# Patient Record
Sex: MALE | Race: WHITE | URBAN - METROPOLITAN AREA
[De-identification: names, ages, dates, MRNs, and addresses within clinical notes are randomized per-mention and may not be internally consistent; named-entity substitution may affect disease eponyms.]

---

## 2017-06-21 ENCOUNTER — OFFICE VISIT (OUTPATIENT)
Dept: DERMATOLOGY | Facility: AMBULATORY SURGERY CENTER | Age: 82
End: 2017-06-21

## 2017-06-21 ENCOUNTER — HOSPITAL ENCOUNTER (OUTPATIENT)
Dept: LAB | Age: 82
Discharge: HOME | End: 2017-06-21

## 2017-06-21 VITALS
DIASTOLIC BLOOD PRESSURE: 82 MMHG | HEART RATE: 57 BPM | TEMPERATURE: 98.7 F | OXYGEN SATURATION: 99 % | HEIGHT: 69 IN | WEIGHT: 164 LBS | BODY MASS INDEX: 24.29 KG/M2 | SYSTOLIC BLOOD PRESSURE: 162 MMHG

## 2017-06-21 DIAGNOSIS — L57.0 LICHENOID KERATOSIS: ICD-10-CM

## 2017-06-21 DIAGNOSIS — C44.219 BASAL CELL CARCINOMA, EAR, LEFT: Primary | ICD-10-CM

## 2017-06-21 DIAGNOSIS — C44.319 BASAL CELL CARCINOMA OF EYEBROW: ICD-10-CM

## 2017-06-21 DIAGNOSIS — L57.0 ACTINIC KERATOSIS: ICD-10-CM

## 2017-06-21 DIAGNOSIS — Z87.2 HISTORY OF ACTINIC KERATOSES: ICD-10-CM

## 2017-06-21 DIAGNOSIS — Z85.820 PERSONAL HISTORY OF MALIGNANT MELANOMA OF SKIN: ICD-10-CM

## 2017-06-21 DIAGNOSIS — L82.1 OTHER SEBORRHEIC KERATOSIS: ICD-10-CM

## 2017-06-21 DIAGNOSIS — L90.5 SCAR CONDITION AND FIBROSIS OF SKIN: ICD-10-CM

## 2017-06-21 DIAGNOSIS — C44.41 BASAL CELL CARCINOMA OF LEFT SIDE OF NECK: ICD-10-CM

## 2017-06-21 DIAGNOSIS — Z85.828 HISTORY OF SKIN CANCER: ICD-10-CM

## 2017-06-21 DIAGNOSIS — C44.209: ICD-10-CM

## 2017-06-21 RX ORDER — OXYBUTYNIN CHLORIDE 15 MG/1
15 TABLET, EXTENDED RELEASE ORAL DAILY
COMMUNITY

## 2017-06-21 RX ORDER — LANOLIN ALCOHOL/MO/W.PET/CERES
400 CREAM (GRAM) TOPICAL DAILY
COMMUNITY

## 2017-06-21 RX ORDER — LIDOCAINE HYDROCHLORIDE AND EPINEPHRINE 10; 10 MG/ML; UG/ML
3 INJECTION, SOLUTION INFILTRATION; PERINEURAL ONCE
Qty: 3 ML | Refills: 0
Start: 2017-06-21 | End: 2017-06-21

## 2017-06-21 RX ORDER — BUPIVACAINE HYDROCHLORIDE AND EPINEPHRINE 2.5; 5 MG/ML; UG/ML
INJECTION, SOLUTION INFILTRATION; PERINEURAL
Qty: 1.5 ML | Refills: 0
Start: 2017-06-21 | End: 2018-01-03 | Stop reason: ALTCHOICE

## 2017-06-21 NOTE — PROGRESS NOTES
This note is written by Bárbara Perea, as dictated by Harsha Patton. Chelsey Tamez MD.    CC: Skin exam     History of present illness:     Taran Miguel is a 80 y.o. male and established patient who presents for a skin exam. He has a history of numerous skin cancers that have been treated over the years, these included multiple basal cell carcinomas as well as malignant melanoma stage IA 0.07 mm treated on his back with wide excision in October 2015. He presents today for routine 6 month surveillance, which includes the opportunity for skin examination and for treatment of any lesions that are identified. He is aware of a small bump on his left ear at the rim and a scaly patch on his right cheek that is irritated by shaving. Surgical sites on his ears and face and back are well-healed without symptoms. He last year used topical 5 fluorouracil on his forearms and had very good response, the skin there is smooth with no symptoms. He has had bypass surgery. He is feeling well and in his usual state of health today. He has no pain, no current illnesses, no other skin concerns. His allergies, medications, medical, and social history are reviewed by me today. Exam:     He is an awake, alert, and oriented 80 y.o. male who appears well and in no distress. There is no preauricular, submandibular, or cervical lymphadenopathy. I examined his scalp, back, ears, face, and neck, arms, hands, chest and abdomen. He has a 4 x 3 mm shiny pink papule on his left mid helical rim. He has a 4 x 4 mm pearly papule on the right medial eyebrow concerning for basal cell carcinoma. He has a 9 x 6 mm pink patch on his left postauricular neck. He has a thin actinic keratoses on his right cheek. He has a lichenoid keratosis on his left upper arm. He confirms all locations. He has multiple well-healed surgical scars throughout the examined area, none have evidence of recurrence of treated skin cancers.  Melanoma excision scar in his back is also well healed without evidence of recurrence. There is a pigmented seborrheic keratosis without concerning features on his mid back. Assessment/plan:    1. Neoplasm of uncertain behavior, left mid helical rim. A shave biopsy was advised to address this lesion. The procedure was reviewed and verbal and written consents were obtained. The risks of pain, bleeding, infection, and scar were discussed. I performed the procedure. The site was cleansed and anesthetized with 1% lidocaine with epinephrine 1:100,000. A shave biopsy was performed to sample the lesion. Drysol was used for hemostasis. The wound was bandaged and care reviewed. The specimen was processed in the Mohs Laboratory. Dermatology Pathology Report  [facility] [address]  [address]      Name: Tameka Reyes    YOB: 1935    Specimen #: [id number]    Date: 6/21/2017      Submitting physician: Melissa Quinn MD      Source of tissue: Left mid helical rim    Clinical Diagnosis: Basal cell carcinoma     Gross description:  Received fresh is a 4 x 4 mm shave biopsy of skin. The specimen is bisected and processed for frozen vertical sections. Microscopic description: This hematoxylin and eosin stained specimen shows basaloid tumor islands in the dermis    Pathology diagnosis: Nodular basal cell carcinoma       Melissa Quinn MD    2. Basal cell carcinoma, left mid helical rim. I discussed the diagnosis of basal cell carcinoma and summarized the pathology report. Mohs surgery is indicated by site. The procedure was discussed, verbal and written consent were obtained. I performed the procedure. One stage was required to reach a tumor free plane. The surgical defect was managed with complex repair. There were no complications. He will follow up as needed as the site heals.      Indications, risks, and options were discussed with Tameka Reyes preoperatively. Risks including, but not limited to: pain, bleeding, infection, tumor recurrence, scarring and damage to motor and/or sensory nerves, were discussed. Shaneka Mcclain chose Mohs surgery. Shaneka Mcclain was an acceptable surgery candidate. Shaneka Mcclain was placed in the appropriate position on the operating table in the Mohs surgery procedure room. The area was prepped and draped in the standard manner. Gentian violet was used to outline the clinical margins of the tumor. Local anesthesia was then obtained. The grossly visible tumor was then removed, an underlying layer was excised and mapped according to the Mohs technique, and the individual specimens examined microscopically. The process was repeated until microscopic examination of the tissue specimens confirmed a tumor-free plane. Hemostasis was obtained with electrosurgery and pressure. The wound was covered between stages with moist saline gauze. Wound care instructions (written and verbal) and a follow up appointment were given to Shaneka Mcclain before discharge. Shaneka Mcclain was discharged in good condition. The wound management options of second intent healing, layered closure, local flap, and/or full thickness skin graft were discussed. Shaneka Mcclain understands the aims, risks, alternatives, and possible complications and elects to proceed with a complex layered closure. Wound margins were made vertical, edges undermined in the perichondrial plane, standing cones corrected at both poles followed by layered closure. The wound was closed with buried 5-0 polysorb suture in the muscle and deep subcutis to reduce width of the wound, a second layer in the dermis to reduce tension on the skin edges, and skin edges were approximated with 6-0 fast gut suture. The final closure length was 32 mm. The wound was bandaged with Vaseline, Telfa, gauze and Coverroll.  Wound care instructions (written and verbal) and a follow up appointment were given to Tameka Reyes before discharge. Tameka Reyes was discharged in good condition. 3. Neoplasm of uncertain behavior, right medial brow, favor BCC. Excision was advised for removal and therapy of this 4 x 4 mm lesion on the right medial brow.  The excision procedure was reviewed and verbal and written consents were obtained.  The risks of pain, bleeding, infection, recurrence of the lesion, and scar were discussed.  I performed the procedure.  The site was cleansed and anesthetized with 1% lidocaine with epinephrine 1:100,000.  The lesion was excised with a 2 mm margin in an elliptical manner to a depth of subcutaneous tissue.  An intermediate repair was performed after the excision. 5-0 polysorb suture was used for approximation of deep tissues and a second layer of 5-0 polysorb suture was used to approximate the skin edges. The closure length was 14 mm.  The wound was bandaged and care reviewed.  The specimen was sent to pathology. I will contact the patient with the results and any further treatment that may be necessary          4. Neoplasm of uncertain behavior, left post auricular neck. Curettage was advised to address this lesion with malignant destruction. The procedure was reviewed and verbal and written consent were obtained. The risks of pain, bleeding, infection, and scar and recurrence were discussed. I performed the procedure. The site was cleansed and anesthetized with 1% lidocaine with epinephrine 1:100,000. Curettage of the base and 2 mm margin to remove the lesion in its clinical entirety, resulting in a post curettage defect size of 13 by 10  mm. Electrocoagulation was used for hemostasis. The wound was bandaged and care reviewed. The specimen was sent to pathology. I will contact the patient with the results and any further treatment that may be necessary. 5. Actinic Keratoses, right cheek.   The diagnosis of this precancerous lesion related to sun exposure was reviewed. Verbal consent was obtained. I treated 1 lesion with cryotherapy and post-cryotherapy care was reviewed. 6. History of skin cancer including NMSC and melanoma. I discussed the diagnosis and recommend routine examinations with me for surveillance. He will arrange his next appt for December. 7. Seborrheic keratosis on the mid back. Lichenoid keratosis on the arm. I discussed the diagnoses and reassured him come into treatment as needed. 8. History of actinic keratoses. He has well healed after use of 5 fluorouracil. No need for repeat therapy at present given absence of actinic keratoses on his arms and hands. The documentation recorded by the scribe accurately reflects the service I personally performed and the decisions made by me. [facility]   OFFICE PROCEDURE PROGRESS NOTE     Chart reviewed for the following:     Lattie Silvan Margrett Sever, MD, have reviewed the History, Physical and updated the Allergic reactions for [address] performed immediately prior to start of procedure:     Lattie Silvan Margrett Sever, MD, have performed the following reviews on [facility] prior to the start of the procedure:     * Patient was identified by name and date of birth   * Agreement on procedure being performed was verified   * Risks and Benefits explained to the patient   * Procedure site verified and marked as necessary   * Patient was positioned for comfort   * Consent was signed and verified     Time: 10:58 AM   Date of procedure: 6/21/2017  Procedure performed by: Susy Shin.  Margrett Sever, MD   Provider assisted by: LPN   Patient assisted by: self   How tolerated by patient: tolerated the procedure well with no complications   Comments: none

## 2017-06-21 NOTE — PROGRESS NOTES
Pre-op: Patient present today for the evaluation of BCC to the Left mid helical rim. Procedure explained with full understanding. Vitals:    06/21/17 1005   BP: 162/82   Pulse: (!) 57   Temp: 98.7 °F (37.1 °C)   TempSrc: Oral   SpO2: 99%   Weight: 74.4 kg (164 lb)   Height: 5' 8.5\" (1.74 m)     preoperatively, will continue to monitor. Post-op: Written and verbal post-op wound care instructions given to patient with full understanding of care. Surgical wound bandaged with Vaseline, Telfa, 2x2 gauze, and coverall tape. All questions and concerns addressed. Vitals stable postoperatively.

## 2017-06-21 NOTE — MR AVS SNAPSHOT
Visit Information Date & Time Provider Department Dept. Phone Encounter #  
 6/21/2017 10:30 AM Bri Juarez MD [facility] [address] [phone] [id number] Upcoming Health Maintenance Date Due DTaP/Tdap/Td series (1 - Tdap) 6/8/1956 ZOSTER VACCINE AGE 60> 6/8/1995 GLAUCOMA SCREENING Q2Y 6/8/2000 Pneumococcal 65+ Low/Medium Risk (1 of 2 - PCV13) 6/8/2000 MEDICARE YEARLY EXAM 6/8/2000 INFLUENZA AGE 9 TO ADULT 8/1/2017 Allergies as of 6/21/2017  Review Complete On: 6/21/2017 By: Yesi Denise, LPN Severity Noted Reaction Type Reactions Lisinopril-hydrochlorothiazide  12/20/2012    Other (comments) Lip and facial swelling Current Immunizations  Never Reviewed No immunizations on file. Not reviewed this visit Vitals BP Pulse Temp Height(growth percentile) Weight(growth percentile) SpO2  
 162/82 (BP 1 Location: Left arm, BP Patient Position: Sitting) (!) 57 98.7 °F (37.1 °C) (Oral) 5' 8.5\" (1.74 m) 164 lb (74.4 kg) 99% BMI Smoking Status 24.57 kg/m2 Former Smoker BMI and BSA Data Body Mass Index Body Surface Area 24.57 kg/m 2 1.9 m 2 Preferred Pharmacy Pharmacy Name Phone [facility] [address] [place] [address] [phone] Your Updated Medication List  
  
   
This list is accurate as of: 6/21/17 12:02 PM.  Always use your most recent med list.  
  
  
  
  
 aspirin delayed-release 81 mg tablet Take  by mouth daily. * bupivacaine-EPINEPHrine 0.25 %-1:200,000 Soln Commonly known as:  Ke Jonatan Used for mohs surgery  Indications: LOCAL ANESTHESIA FOR PROCEDURES  
  
 * bupivacaine-EPINEPHrine 0.25 %-1:200,000 Soln Commonly known as:  Ke Jonatan Used for mohs surgery  Indications: LOCAL ANESTHESIA FOR PROCEDURES  
  
 fluorouracil 5 % chemo cream  
Commonly known as:  EFUDEX Apply  to affected area two (2) times a day. GLUCOTROL 5 mg tablet Generic drug:  glipiZIDE Take  by mouth two (2) times a day.  
  
 imiquimod 5 % cream  
Commonly known as:  Selena Pantojas Apply 1 Packet to affected area five (5) days a week. apply a thin layer as directed to mid right ear nightly Monday-Friday for 8 weeks total.  Indications: SUPERFICIAL BASAL CELL CARCINOMA losartan 25 mg tablet Commonly known as:  COZAAR Take  by mouth daily. magnesium oxide 400 mg tablet Commonly known as:  MAG-OX Take 400 mg by mouth daily. metoprolol tartrate 50 mg tablet Commonly known as:  LOPRESSOR Take  by mouth two (2) times a day. mirtazapine 15 mg tablet Commonly known as:  Orlean Cancel Take  by mouth nightly. multivitamin tablet Commonly known as:  ONE A DAY Take 1 Tab by mouth daily. oxybutynin chloride XL 15 mg CR tablet Commonly known as:  DITROPAN XL Take 15 mg by mouth daily. * sertraline 100 mg tablet Commonly known as:  ZOLOFT Take 125 mg by mouth daily. * sertraline 50 mg tablet Commonly known as:  ZOLOFT  
  
 simvastatin 20 mg tablet Commonly known as:  ZOCOR Take  by mouth nightly. * terazosin 10 mg capsule Commonly known as:  HYTRIN Take 10 mg by mouth nightly. * terazosin 5 mg capsule Commonly known as:  HYTRIN  
  
 VITAMIN D3 1,000 unit Cap Generic drug:  cholecalciferol Take  by mouth. * Notice: This list has 6 medication(s) that are the same as other medications prescribed for you. Read the directions carefully, and ask your doctor or other care provider to review them with you. Patient Instructions WOUND CARE INSTRUCTIONS 1. Keep the dressing clean and dry and do not remove for 48 hours. 2. Then change the dressing once a day as follows: 
a. Wash hands before and after each dressing change.  
b. Remove dressing and wash site gently with mild soap and water, rinse, and pat dry. 
c. Apply an ointment (Bacitracin, Polysporin, Neosporin, Petroleum jelly or Aquaphor). d. Apply a non-stick (Telfa) dressing or Band-Aid to cover the wound. Remove pressure bandage Saturday, then wash gently and apply Vaseline and a band-aid to site daily for 1 week. 3. Watch for: BLEEDING: A small amount of drainage may occur. If bleeding occurs, elevate and rest the surgery site. Apply gauze and steady pressure for 15 minutes. If bleeding continues, call this office. INFECTION: Signs of infection include increased redness, pain, warmth, drainage of pus, and fever. If this occurs, call this office. 4. Special Instructions (follow any that are checked): ·  You have stitches that need to be removed in 0 days ·  Avoid bending at the waist and heavy lifting for two days. ·  Sleep with your head elevated for the next two nights. ·  Rest the surgery site and keep it elevated as much as possible for two days. ·  You may apply an ice-pack for 10-15 minutes every waking hour for the rest of the day. ·  Eat a soft diet and avoid hot food and hot drinks for the rest of the day. ·  Other instructions: Follow up as directed Take Tylenol for pain as needed. Once the site is healed with no remaining bandages or open areas, protect your surgical site and scar from the sun, as this area will be more sensitive. Use a broad spectrum sunscreen SPF 30 or higher daily, and a chemical free product (one containing zinc oxide or titanium dioxide) is a good choice if the area is sensitive. You may begin to gently massage the surgical site in 2-3 weeks, rubbing in a circular motion along the scar. This can help reduce swelling and thickness of a scar. A scar cream may be used beginnning 1 month after the surgery. If you have any questions or concerns, please call our office Monday through Friday at [phone]. Introducing [facility]! Rosario Quintero introduces [employer] patient portal. Now you can access parts of your medical record, email your doctor's office, and request medication refills online. 1. In your internet browser, go to [email or web address] 2. Click on the First Time User? Click Here link in the Sign In box. You will see the New Member Sign Up page. 3. Enter your [employer] Access Code exactly as it appears below. You will not need to use this code after youve completed the sign-up process. If you do not sign up before the expiration date, you must request a new code. · [employer] Access Code: [email or web address] Expires: 9/19/2017 12:02 PM 
 
4. Enter the last four digits of your Social Security Number (xxxx) and Date of Birth (mm/dd/yyyy) as indicated and click Submit. You will be taken to the next sign-up page. 5. Create a [employer] ID. This will be your [employer] login ID and cannot be changed, so think of one that is secure and easy to remember. 6. Create a [employer] password. You can change your password at any time. 7. Enter your Password Reset Question and Answer. This can be used at a later time if you forget your password. 8. Enter your e-mail address. You will receive e-mail notification when new information is available in [address] 9. Click Sign Up. You can now view and download portions of your medical record. 10. Click the Download Summary menu link to download a portable copy of your medical information. If you have questions, please visit the Frequently Asked Questions section of the [employer] website. Remember, [employer] is NOT to be used for urgent needs. For medical emergencies, dial 911. Now available from your iPhone and Android! Please provide this summary of care documentation to your next provider. If you have any questions after today's visit, please call [phone].

## 2017-06-21 NOTE — PATIENT INSTRUCTIONS
WOUND CARE INSTRUCTIONS    1. Keep the dressing clean and dry and do not remove for 48 hours. 2. Then change the dressing once a day as follows:  a. Wash hands before and after each dressing change. b. Remove dressing and wash site gently with mild soap and water, rinse, and pat dry.  c. Apply an ointment (Bacitracin, Polysporin, Neosporin, Petroleum jelly or Aquaphor). d. Apply a non-stick (Telfa) dressing or Band-Aid to cover the wound. Remove pressure bandage Saturday, then wash gently and apply Vaseline and a band-aid to site daily for 1 week. 3. Watch for:  BLEEDING: A small amount of drainage may occur. If bleeding occurs, elevate and rest the surgery site. Apply gauze and steady pressure for 15 minutes. If bleeding continues, call this office. INFECTION: Signs of infection include increased redness, pain, warmth, drainage of pus, and fever. If this occurs, call this office. 4. Special Instructions (follow any that are checked):  · [] You have stitches that need to be removed in 0 days  · [x] Avoid bending at the waist and heavy lifting for two days. · [x] Sleep with your head elevated for the next two nights. · [x] Rest the surgery site and keep it elevated as much as possible for two days. · [x] You may apply an ice-pack for 10-15 minutes every waking hour for the rest of the day. · [] Eat a soft diet and avoid hot food and hot drinks for the rest of the day. · [] Other instructions: Follow up as directed  Take Tylenol for pain as needed. Once the site is healed with no remaining bandages or open areas, protect your surgical site and scar from the sun, as this area will be more sensitive. Use a broad spectrum sunscreen SPF 30 or higher daily, and a chemical free product (one containing zinc oxide or titanium dioxide) is a good choice if the area is sensitive. You may begin to gently massage the surgical site in 2-3 weeks, rubbing in a circular motion along the scar.  This can help reduce swelling and thickness of a scar. A scar cream may be used beginnning 1 month after the surgery. If you have any questions or concerns, please call our office Monday through Friday at [phone].

## 2017-06-22 NOTE — PROGRESS NOTES
I called his home # on 6/22 and reviewed excised BCC and curetted superficial BCC, no further treatment needed. He is healing well so far.

## 2018-01-03 ENCOUNTER — OFFICE VISIT (OUTPATIENT)
Dept: DERMATOLOGY | Facility: AMBULATORY SURGERY CENTER | Age: 83
End: 2018-01-03

## 2018-01-03 ENCOUNTER — HOSPITAL ENCOUNTER (OUTPATIENT)
Dept: LAB | Age: 83
Discharge: HOME | End: 2018-01-03

## 2018-01-03 VITALS
WEIGHT: 168.4 LBS | DIASTOLIC BLOOD PRESSURE: 80 MMHG | HEIGHT: 69 IN | TEMPERATURE: 97.9 F | RESPIRATION RATE: 20 BRPM | SYSTOLIC BLOOD PRESSURE: 140 MMHG | OXYGEN SATURATION: 97 % | BODY MASS INDEX: 24.94 KG/M2 | HEART RATE: 65 BPM

## 2018-01-03 DIAGNOSIS — D48.5 NEOPLASM OF UNCERTAIN BEHAVIOR OF SKIN OF FOREHEAD: ICD-10-CM

## 2018-01-03 DIAGNOSIS — D48.5 NEOPLASM OF UNCERTAIN BEHAVIOR OF SKIN OF TEMPORAL REGION: Primary | ICD-10-CM

## 2018-01-03 DIAGNOSIS — L90.5 SCAR CONDITION AND FIBROSIS OF SKIN: ICD-10-CM

## 2018-01-03 DIAGNOSIS — L98.9 SKIN LESION OF BACK: ICD-10-CM

## 2018-01-03 DIAGNOSIS — L98.9 SKIN LESION OF CHEEK: ICD-10-CM

## 2018-01-03 DIAGNOSIS — D48.5 NEOPLASM OF UNCERTAIN BEHAVIOR OF SKIN OF CHEEK: ICD-10-CM

## 2018-01-03 DIAGNOSIS — L57.0 ACTINIC KERATOSIS: ICD-10-CM

## 2018-01-03 DIAGNOSIS — Z85.828 FOLLOW-UP SURVEILLANCE OF SKIN CANCER, ENCOUNTER FOR: ICD-10-CM

## 2018-01-03 DIAGNOSIS — Z85.820 PERSONAL HISTORY OF MALIGNANT MELANOMA OF SKIN: ICD-10-CM

## 2018-01-03 DIAGNOSIS — L82.1 SEBORRHEIC KERATOSES: ICD-10-CM

## 2018-01-03 DIAGNOSIS — D48.5 NEOPLASM OF UNCERTAIN BEHAVIOR OF SKIN: ICD-10-CM

## 2018-01-03 DIAGNOSIS — Z85.828 HISTORY OF NONMELANOMA SKIN CANCER: ICD-10-CM

## 2018-01-03 DIAGNOSIS — D48.5 NEOPLASM OF UNCERTAIN BEHAVIOR OF SKIN OF NECK: ICD-10-CM

## 2018-01-03 DIAGNOSIS — Z08 FOLLOW-UP SURVEILLANCE OF SKIN CANCER, ENCOUNTER FOR: ICD-10-CM

## 2018-01-03 NOTE — PROGRESS NOTES
Chief Complaint   Patient presents with    Skin Exam     1. Have you been to the ER, urgent care clinic since your last visit? Hospitalized since your last visit? No    2. Have you seen or consulted any other health care providers outside of the [address] [place] [address] since your last visit? Include any pap smears or colon screening.  No

## 2018-01-03 NOTE — PROGRESS NOTES
Name: Shelby Sandoval       Age: 80 y.o. Date: 1/3/2018    Chief Complaint:   Chief Complaint   Patient presents with    Skin Exam       Subjective:    HPI  Mr. Shelby Sandoval is a 80 y.o. male who presents for a skin exam.  The patient's last skin exam was on 6/21/17 and the patient does have current complaints related to his skin. He reports a lesion in the left sideburn that has been present for a few months. He notes a vast improvement in the scaly spots on his arm status post the use of 5 fluorouracil. He states this was tough during the use and brought a picture to show the reaction which was quite vivid. The patient's pertinent skin history includes : Personal history of melanoma stage Ia of the mid back treated about 2015, multiple nonmelanoma skin cancers, actinic keratoses managed with multiple modalities. ROS: Constitutional: Negative. Dermatological : positive for - skin lesion changes      Social History     Social History    Marital status:      Spouse name: N/A    Number of children: N/A    Years of education: N/A     Occupational History    Not on file.      Social History Main Topics    Smoking status: Former Smoker    Smokeless tobacco: Never Used    Alcohol use No    Drug use: Not on file    Sexual activity: Not on file     Other Topics Concern    Not on file     Social History Narrative       Family History   Problem Relation Age of Onset    No Known Problems Mother     No Known Problems Father        Past Medical History:   Diagnosis Date    Diabetes mellitus ([address]     Essential hypertension     Skin cancer     Basal Cell Carcinoma    Skin cancer     Melanoma       Past Surgical History:   Procedure Laterality Date    AMB POC MOHS 1 STAGE H/N/HF/G  2010    Basal Cell Carcinoma    AMB POC MOHS 1 STAGE H/N/HF/G  12/20/12    Basal Cell Carcinoma; right upper lip       Current Outpatient Prescriptions   Medication Sig Dispense Refill    magnesium oxide (MAG-OX) 400 mg tablet Take 400 mg by mouth daily.  sertraline (ZOLOFT) 50 mg tablet       terazosin (HYTRIN) 5 mg capsule       aspirin delayed-release 81 mg tablet Take  by mouth daily.  metoprolol (LOPRESSOR) 50 mg tablet Take  by mouth two (2) times a day.  Cholecalciferol, Vitamin D3, (VITAMIN D3) 1,000 unit cap Take  by mouth.  multivitamin (ONE A DAY) tablet Take 1 Tab by mouth daily.  mirtazapine (REMERON) 15 mg tablet Take  by mouth nightly.  simvastatin (ZOCOR) 20 mg tablet Take  by mouth nightly.  oxybutynin chloride XL (DITROPAN XL) 15 mg CR tablet Take 15 mg by mouth daily.  fluorouracil (EFUDEX) 5 % chemo cream Apply  to affected area two (2) times a day. 40 g 0    imiquimod (ALDARA) 5 % cream Apply 1 Packet to affected area five (5) days a week. apply a thin layer as directed to mid right ear nightly Monday-Friday for 8 weeks total.  Indications: SUPERFICIAL BASAL CELL CARCINOMA 12 Packet 6    sertraline (ZOLOFT) 100 mg tablet Take 125 mg by mouth daily.  terazosin (HYTRIN) 10 mg capsule Take 10 mg by mouth nightly.  losartan (COZAAR) 25 mg tablet Take  by mouth daily.  glipiZIDE (GLUCOTROL) 5 mg tablet Take  by mouth two (2) times a day. Allergies   Allergen Reactions    Lisinopril-Hydrochlorothiazide Other (comments)     Lip and facial swelling          Objective:    Visit Vitals    /80 (BP 1 Location: Left arm, BP Patient Position: Sitting)    Pulse 65    Temp 97.9 °F (36.6 °C) (Oral)    Resp 20    Ht 5' 8.5\" (1.74 m)    Wt 76.4 kg (168 lb 6.4 oz)    SpO2 97%    BMI 25.23 kg/m2       Katherin Milan is a 80 y.o. male who appears well and in no distress. He is awake, alert, and oriented. There is no preauricular, submandibular, or cervical lymphadenopathy.   A skin examination was performed including his scalp, face (including eyelids), ears, neck, chest, back, abdomen, upper extremities (including digits/nails), breasts. There are thin scaled actinic keratosis noted on each dorsal hand. There is been vast improvement in the number of actinic keratoses noted on each forearm. He has fair skin. He is scattered stuck on waxy macules and keratotic papules consistent with seborrheic keratosis. There are multiple well-healed scars without evidence of lesion recurrence-located on the face and the trunk. He has a few scattered cherry angiomas. On the left upper back there is a 8 x 8 mm indistinct pink shiny macule with telangiectasias concerning for basal cell carcinoma. On the left temple there is a 4 x 4 millimeter pink shiny papule with a dot of pigment and telangiectasias, concerning for basal cell carcinoma. Just inferior to this are patches of pink macule, concerning for superficial basal cell as well. On the left sideburn there is a hemorrhagic crusted papule, 10 x 5 mm, concerning for basal cell carcinoma. On the left mid forehead there is a 3 x 2 mm pink shiny papule telangiectasias, just inferior to the scar, concerning for basal cell carcinoma. On the right post regular neck there is a 6 x 4 mm indistinct pink shiny macule, at the adjacent edge of the scar, concerning for basal cell carcinoma. On the left cheek is a 4 x 4 mm pink shiny papule concerning for BCC. Last, on the right preauricular cheek there is a 3 x 3 mm shiny pink papule concerning for basal cell carcinoma located the inferior aspect of the scar. His melanoma scar on his back is without nodularity or pigment to suggest lesion recurrence in the scar or surrounding skin. Assessment/Plan:  1. Neoplasm of Uncertain Behavior, left temple. The differential diagnoses were discussed. A shave biopsy was advised to sample this lesion. The procedure was reviewed and verbal and written consent were obtained. The risks of pain, bleeding, infection, and scar were discussed.   The patient is aware that this is a sample and is intended for diagnosis and not therapy of the skin lesion. I performed the procedure. The site was cleansed and anesthetized with 1% Lidocaine with Epinephrine 1:100,000. A shave biopsy was performed to sample the lesion. Drysol was used for hemostasis. The wound was bandaged and care reviewed. The specimen was sent to pathology. I will contact the patient with the results and any further treatment that may be necessary. 2.Neoplasm of Uncertain Behavior, left sideburn. The differential diagnoses were discussed. A shave biopsy was advised to sample this lesion. The procedure was reviewed and verbal and written consent were obtained. The risks of pain, bleeding, infection, and scar were discussed. The patient is aware that this is a sample and is intended for diagnosis and not therapy of the skin lesion. I performed the procedure. The site was cleansed and anesthetized with 1% Lidocaine with Epinephrine 1:100,000. A shave biopsy was performed to sample the lesion. Drysol was used for hemostasis. The wound was bandaged and care reviewed. The specimen was sent to pathology. I will contact the patient with the results and any further treatment that may be necessary. 3. Neoplasm of Uncertain Behavior, left cheek. The differential diagnoses were discussed. Dr Summer Cristina will excise at next appt. 4.Neoplasm of Uncertain Behavior, left mid forehead. The differential diagnoses were discussed. Shave biopsy and then curettage was advised to address this lesion with malignant destruction. The procedure was reviewed and verbal and written consent were obtained. The risks of pain, bleeding, infection, scar, and recurrence of the lesion were discussed. I performed the procedure. The site was cleansed and anesthetized with 1% Lidocaine with Epinephrine 1:100,000. Curettage was performed by me to include a 2 mm margin, resulting in a post curettage defect size of 7 x 6 mm.   Drysol was used for hemostasis. The wound was bandaged and care reviewed. The specimen was sent to pathology. I will contact the patient with the results and any further treatment that may be necessary. 5.Neoplasm of Uncertain Behavior, right preauricular cheek. The differential diagnoses were discussed. Shave biopsy and curettage was advised to address this lesion with malignant destruction. The procedure was reviewed and verbal and written consent were obtained. The risks of pain, bleeding, infection, scar, and recurrence of the lesion were discussed. I performed the procedure. The site was cleansed and anesthetized with 1% Lidocaine with Epinephrine 1:100,000. Curettage was performed by me to include a 2 mm margin, resulting in a post curettage defect size of 7 x 7 mm. Drysol was used for hemostasis. The wound was bandaged and care reviewed. The specimen was sent to pathology. I will contact the patient with the results and any further treatment that may be necessary. 6. Neoplasm of Uncertain Behavior, right postauricular neck. The differential diagnoses were discussed. Curettage was advised to address this lesion with malignant destruction. The procedure was reviewed and verbal and written consent were obtained. The risks of pain, bleeding, infection, scar, and recurrence of the lesion were discussed. I performed the procedure. The site was cleansed and anesthetized with 1% Lidocaine with Epinephrine 1:100,000. Curettage was performed by me to include a 2 mm margin, resulting in a post curettage defect size of 10 x 8 mm. Drysol was used for hemostasis. The wound was bandaged and care reviewed. The specimen was sent to pathology. I will contact the patient with the results and any further treatment that may be necessary. 7. Seborrheic keratoses. The diagnosis was reviewed and the patient was reassured that no treatment is needed for these benign lesions.   8.Personal history of skin cancer - melanoma and non melanoma skin cancers. I discussed sun protection, sunscreen use, the warning signs of skin cancer, the need for self-skin examinations, and the need for regular practitioner exams every 6 months. The patient should follow up sooner as needed if new, changing, or symptomatic skin lesions arise. 9. Neoplasm of Uncertain Behavior, left upper back. The differential diagnoses were discussed. Will observe for now, BCC favored. 10. Actinic Keratoses. The diagnosis of this precancerous lesion related to sun exposure was reviewed. Verbal consent was obtained. I treated 3 lesions with cryotherapy and post-cryotherapy care was reviewed. [facility]   OFFICE PROCEDURE PROGRESS NOTE   Chart reviewed for the following:   IOvi, have reviewed the History, Physical and updated the Allergic reactions for Myke Rim. TIME OUT performed immediately prior to start of procedure:   Graciela JIMENEZ, have performed the following reviews on Myke Rim   prior to the start of the procedure:     * Patient was identified by name and date of birth   * Agreement on procedure being performed was verified   * Risks and Benefits explained to the patient   * Procedure site verified and marked as necessary   * Patient was positioned for comfort   * Consent was signed and verified     Time: 1187  Date of procedure: 1/3/2018  Procedure performed by: Dedra Mortimer.  Elizabeth Lund DNP  Provider assisted by: Judi Morataya   Patient assisted by: self   How tolerated by patient: tolerated the procedure well with no complications   Comments: none

## 2018-01-03 NOTE — PATIENT INSTRUCTIONS
Self Skin Exam and Sunscreens    Early detection and treatment is essential in the treatment of all forms of skin cancer. If caught early, all forms of skin cancer are curable. In addition to your regular visits, you should perform a monthly skin examination. Over time, you become familiar with what is normally found on your skin and can identify new or suspicious spots. One of the screening tools you can use to assess your skin is to follow the ABCDEs:    A= Asymmetry (One half is unlike the other half)     B= Border (An irregular, scalloped or poorly defined edge)    C= Color (Is varied from one area to another, has shades of tan, brown/ black,       white, red or blue)    D= Diameter (Spots larger than 6mm or a pencil eraser)    E= Evolving (New spots or one that is changing in size, shape, or color)    A follow- up interval will be customized based on your history of skin cancer or level of skin damage and risk factors. In any case, if you notice a suspicious or new spot, an appointment should be arranged between regular visits. Everyone should use sunscreen and sun-safe practices, which is especially important for those with a personal or family history of skin cancer. Suggestions for this include:    1. Use daily moisturizers containing SPF 30 or higher. 2. Wear long sleeve clothing with UPF ratings and a broad-brimmed hat. 3. Apply sunscreen with SPF 30 or higher to all sun exposed areas if you are going to be in the sun. A broad spectrum UVA/ UVB sunscreen is best.  Dont forget to REAPPLY every two hours or more often if swimming or sweating! 4. Avoid outside activities during peak sun hours, especially in the summer (10am- 2pm). 5. DO NOT use tanning beds. Using sunscreen and sun-safe practices can help reduce the likelihood of developing skin cancer or additional skin cancers in those previously diagnosed. Antonio Vogel

## 2018-01-03 NOTE — PROGRESS NOTES
Mr. Bo Marquez comes in for follow-up. He is an 27-year-old man who has had numerous nonmelanoma skin cancers, he has also had a stage I a 0.07 mm melanoma on his back treated with excision several years ago, October 2015. He presents today for his routine surveillance, last seen in June. He has no particular complaints related to his skin. He used topical 5 fluorouracil for actinic keratosis on the arms and hands, he developed an expected reaction which has now resolved. His skin is smoother. He is feeling well and in his usual state of health today. He has no pain, no current illnesses, no other skin concerns. His allergies medications medical and social history are reviewed by me today. I have reviewed the history, physical exam, assessment and plan by Vernon Bansal NP and agree. He is awake alert man who appears well. I examined his scalp face ears neck chest back abdomen arms and hands. His forearms and hands show significant reduction in the number of actinic keratoses. He has several lesions of concern for basal cell carcinomas. These include new primary lesions on the mid forehead, left temple, left side burn, left cheek, and right cheek just below a surgical scar and recurrent lesion on the right postauricular neck. He has a shotty papilloma left upper back, small lesion 4 mm in size, concerning for new basal cell carcinoma. He has multiple well-healed surgical scars. Please refer to further advance the chart for documentation of these lesions. Each diagnosis was reviewed with him. He has a large burden of skin cancer at this visit, these will be addressed in logical manner to manage the skin cancers as is most appropriate for the tumor features and for him. Curettage was performed for the small basal cell carcinomas on his left before head, right cheek below the scar, and right postauricular neck. The small basal cell carcinoma on his left back will be observed for now and treated at a future visit. Nose surgery would be considered for basal cell carcinomas on his left temple and left side burn, the 2 larger and more indistinct lesions, and excision will be considered for the basal cell carcinoma on his left cheek. This would be scheduled at a future visit March 21 to accommodate his travel time from [facility]. He understands, his questions and concerns were answered.

## 2018-01-03 NOTE — MR AVS SNAPSHOT
Visit Information Date & Time Provider Department Dept. Phone Encounter #  
 1/3/2018 10:30 AM BLAINE Panda 8057 [phone] [id number] Your Appointments 1/3/2018 10:30 AM  
Office Visit with BLAINE Panda [address]057 [address]) Appt Note: est pt - yearly skin exam; est pt - yearly skin exam  
 [facility] [address] [address] Upcoming Health Maintenance Date Due DTaP/Tdap/Td series (1 - Tdap) 6/8/1956 ZOSTER VACCINE AGE 60> 4/8/1995 GLAUCOMA SCREENING Q2Y 6/8/2000 Pneumococcal 65+ High/Highest Risk (1 of 2 - PCV13) 6/8/2000 MEDICARE YEARLY EXAM 6/8/2000 Influenza Age 5 to Adult 8/1/2017 Allergies as of 1/3/2018  Review Complete On: 1/3/2018 By: Juanita Good Severity Noted Reaction Type Reactions Lisinopril-hydrochlorothiazide  12/20/2012    Other (comments) Lip and facial swelling Current Immunizations  Never Reviewed No immunizations on file. Not reviewed this visit Vitals BP Pulse Temp Resp Height(growth percentile) Weight(growth percentile) 140/80 (BP 1 Location: Left arm, BP Patient Position: Sitting) 65 97.9 °F (36.6 °C) (Oral) 20 5' 8.5\" (1.74 m) 168 lb 6.4 oz (76.4 kg) SpO2 BMI Smoking Status 97% 25.23 kg/m2 Former Smoker Vitals History BMI and BSA Data Body Mass Index Body Surface Area  
 25.23 kg/m 2 1.92 m 2 Preferred Pharmacy Pharmacy Name Phone [address] [address] [facility] [address] [phone] Your Updated Medication List  
  
   
This list is accurate as of: 1/3/18 10:23 AM.  Always use your most recent med list.  
  
  
  
  
 aspirin delayed-release 81 mg tablet Take  by mouth daily.   
  
 fluorouracil 5 % chemo cream  
 Commonly known as:  EFUDEX Apply  to affected area two (2) times a day. GLUCOTROL 5 mg tablet Generic drug:  glipiZIDE Take  by mouth two (2) times a day.  
  
 imiquimod 5 % cream  
Commonly known as:  Clyde Ket Apply 1 Packet to affected area five (5) days a week. apply a thin layer as directed to mid right ear nightly Monday-Friday for 8 weeks total.  Indications: SUPERFICIAL BASAL CELL CARCINOMA losartan 25 mg tablet Commonly known as:  COZAAR Take  by mouth daily. magnesium oxide 400 mg tablet Commonly known as:  MAG-OX Take 400 mg by mouth daily. metoprolol tartrate 50 mg tablet Commonly known as:  LOPRESSOR Take  by mouth two (2) times a day. mirtazapine 15 mg tablet Commonly known as:  Veola Kitchen Take  by mouth nightly. multivitamin tablet Commonly known as:  ONE A DAY Take 1 Tab by mouth daily. oxybutynin chloride XL 15 mg CR tablet Commonly known as:  DITROPAN XL Take 15 mg by mouth daily. * sertraline 100 mg tablet Commonly known as:  ZOLOFT Take 125 mg by mouth daily. * sertraline 50 mg tablet Commonly known as:  ZOLOFT  
  
 simvastatin 20 mg tablet Commonly known as:  ZOCOR Take  by mouth nightly. * terazosin 10 mg capsule Commonly known as:  HYTRIN Take 10 mg by mouth nightly. * terazosin 5 mg capsule Commonly known as:  HYTRIN  
  
 VITAMIN D3 1,000 unit Cap Generic drug:  cholecalciferol Take  by mouth. * Notice: This list has 4 medication(s) that are the same as other medications prescribed for you. Read the directions carefully, and ask your doctor or other care provider to review them with you. Patient Instructions Self Skin Exam and Sunscreens Early detection and treatment is essential in the treatment of all forms of skin cancer. If caught early, all forms of skin cancer are curable.   In addition to your regular visits, you should perform a monthly skin examination. Over time, you become familiar with what is normally found on your skin and can identify new or suspicious spots. One of the screening tools you can use to assess your skin is to follow the ABCDEs: 
 
A= Asymmetry (One half is unlike the other half) B= Border (An irregular, scalloped or poorly defined edge) C= Color (Is varied from one area to another, has shades of tan, brown/ black,       white, red or blue) D= Diameter (Spots larger than 6mm or a pencil eraser) E= Evolving (New spots or one that is changing in size, shape, or color) A follow- up interval will be customized based on your history of skin cancer or level of skin damage and risk factors. In any case, if you notice a suspicious or new spot, an appointment should be arranged between regular visits. Everyone should use sunscreen and sun-safe practices, which is especially important for those with a personal or family history of skin cancer. Suggestions for this include: 1. Use daily moisturizers containing SPF 30 or higher. 2. Wear long sleeve clothing with UPF ratings and a broad-brimmed hat. 3. Apply sunscreen with SPF 30 or higher to all sun exposed areas if you are going to be in the sun. A broad spectrum UVA/ UVB sunscreen is best.  Dont forget to REAPPLY every two hours or more often if swimming or sweating! 4. Avoid outside activities during peak sun hours, especially in the summer (10am- 2pm). 5. DO NOT use tanning beds. Using sunscreen and sun-safe practices can help reduce the likelihood of developing skin cancer or additional skin cancers in those previously diagnosed. Parvez Shaw Introducing \[facility]\"" & HEALTH SERVICES! [facility] introduces [facility] patient portal. Now you can access parts of your medical record, email your doctor's office, and request medication refills online. 1. In your internet browser, go to [email or web address] 2. Click on the First Time User? Click Here link in the Sign In box. You will see the New Member Sign Up page. 3. Enter your Casetext Access Code exactly as it appears below. You will not need to use this code after youve completed the sign-up process. If you do not sign up before the expiration date, you must request a new code. · Casetext Access Code: [email or web address] Expires: 4/3/2018 10:17 AM 
 
4. Enter the last four digits of your Social Security Number (xxxx) and Date of Birth (mm/dd/yyyy) as indicated and click Submit. You will be taken to the next sign-up page. 5. Create a Casetext ID. This will be your Casetext login ID and cannot be changed, so think of one that is secure and easy to remember. 6. Create a Casetext password. You can change your password at any time. 7. Enter your Password Reset Question and Answer. This can be used at a later time if you forget your password. 8. Enter your e-mail address. You will receive e-mail notification when new information is available in [address] 9. Click Sign Up. You can now view and download portions of your medical record. 10. Click the Download Summary menu link to download a portable copy of your medical information. If you have questions, please visit the Frequently Asked Questions section of the Casetext website. Remember, Casetext is NOT to be used for urgent needs. For medical emergencies, dial 911. Now available from your iPhone and Android! Please provide this summary of care documentation to your next provider. If you have any questions after today's visit, please call [phone].

## 2018-01-08 NOTE — PROGRESS NOTES
I spoke with the patient and he states the areas are healing well. He understands that all were treated with curettage except the left temple and sideburn which he will return for Mohs in March-- and have a BCC clinically diagnosed excised from the left cheek.

## 2018-03-08 ENCOUNTER — TELEPHONE (OUTPATIENT)
Dept: DERMATOLOGY | Facility: AMBULATORY SURGERY CENTER | Age: 83
End: 2018-03-08

## 2018-03-08 NOTE — TELEPHONE ENCOUNTER
LVM regarding MOHs pre op assessment. Pt instructed to call back at earliest convenience.      Appointment on 3/21/18 @ 10:30AM  Site: Left Breed[place] and Left Sideburn

## 2018-03-08 NOTE — TELEPHONE ENCOUNTER
Mohs Pre-Op Assessment    Patient Appointment Date: [email or web address]    Jose Elias Hancock, 80 y.o., male      does confirm site: Left [place] and Left Sideburn  Brief description of [address]  does ID site. (Can they still visibly see the site)  does not have Hepatitis C   does not have HIV (If YES, set up consult appointment)    Allergies: Allergies   Allergen Reactions    Lisinopril-Hydrochlorothiazide Other (comments)     Lip and facial swelling        does not have an Electrical Implanted Device (Pacemaker, AICD, brain stimulator, etc.)    does not need antibiotics      is not taking NSAIDs    is taking aspirin  If yes, is taking because of Prevention     is not taking Garlic  is not taking Ginkgo  is not taking Ginseng  is not taking Fish oils  is not taking Vit E    does not take a blood thinner(i.e. Coumadin/Warfarin, Plavix, Brilinta, Pradaxa, Xarelto, Effient)  If taking Coumadin needs to have PT/INR drawn and faxed results within a week of surgery    Pre operative assessment questions asked to patient. Patient has a general understanding of the procedure, and has been versed that there will be local anesthesia used in the procedure and that He will be ok to drive themselves to and from the appointment. Patient has been notified to arrive 15-20 minutes early and they may eat or drink before arriving.

## 2018-04-25 ENCOUNTER — OFFICE VISIT (OUTPATIENT)
Dept: DERMATOLOGY | Facility: AMBULATORY SURGERY CENTER | Age: 83
End: 2018-04-25

## 2018-04-25 VITALS
WEIGHT: 168 LBS | BODY MASS INDEX: 24.88 KG/M2 | HEIGHT: 69 IN | RESPIRATION RATE: 16 BRPM | HEART RATE: 63 BPM | OXYGEN SATURATION: 99 % | SYSTOLIC BLOOD PRESSURE: 150 MMHG | TEMPERATURE: 97.9 F | DIASTOLIC BLOOD PRESSURE: 88 MMHG

## 2018-04-25 DIAGNOSIS — C44.319 BASAL CELL CARCINOMA OF LEFT TEMPLE REGION: Primary | ICD-10-CM

## 2018-04-25 DIAGNOSIS — D49.2: ICD-10-CM

## 2018-04-25 DIAGNOSIS — C44.319 BASAL CELL CARCINOMA OF SIDEBURN AREA: ICD-10-CM

## 2018-04-25 DIAGNOSIS — D49.2 NEOPLASM OF SKIN OF FOREHEAD: ICD-10-CM

## 2018-04-25 RX ORDER — SIMVASTATIN 20 MG/1
TABLET, FILM COATED ORAL
COMMUNITY
End: 2018-04-25 | Stop reason: ALTCHOICE

## 2018-04-25 RX ORDER — TAMSULOSIN HYDROCHLORIDE 0.4 MG/1
CAPSULE ORAL
COMMUNITY
Start: 2007-07-16 | End: 2018-04-25 | Stop reason: ALTCHOICE

## 2018-04-25 RX ORDER — GLUCOSAMINE SULFATE 1500 MG
POWDER IN PACKET (EA) ORAL
COMMUNITY

## 2018-04-25 RX ORDER — ATORVASTATIN CALCIUM 40 MG/1
TABLET, FILM COATED ORAL
COMMUNITY
Start: 2018-04-20

## 2018-04-25 RX ORDER — GLIPIZIDE 5 MG/1
TABLET ORAL
COMMUNITY
Start: 2014-12-19 | End: 2018-04-25 | Stop reason: SDUPTHER

## 2018-04-25 RX ORDER — METOPROLOL TARTRATE 50 MG/1
TABLET ORAL
COMMUNITY
Start: 2014-12-19 | End: 2018-04-25 | Stop reason: ALTCHOICE

## 2018-04-25 RX ORDER — LORAZEPAM 0.5 MG/1
TABLET ORAL
COMMUNITY
End: 2018-04-25 | Stop reason: ALTCHOICE

## 2018-04-25 RX ORDER — ASPIRIN 325 MG
TABLET ORAL
COMMUNITY
Start: 2014-12-19 | End: 2018-04-25 | Stop reason: ALTCHOICE

## 2018-04-25 RX ORDER — METOPROLOL SUCCINATE 25 MG/1
TABLET, EXTENDED RELEASE ORAL
COMMUNITY
Start: 2018-04-03

## 2018-04-25 RX ORDER — TERAZOSIN 5 MG/1
CAPSULE ORAL
COMMUNITY

## 2018-04-25 RX ORDER — FUROSEMIDE 40 MG/1
TABLET ORAL
COMMUNITY
Start: 2014-12-19 | End: 2018-04-25 | Stop reason: ALTCHOICE

## 2018-04-25 NOTE — PROGRESS NOTES
This note is written by Sweta Clay, as dictated by Jose Carlos Moulton. Cornelius Aguilar MD.    CC: Basal cell carcinomas on the left sideburn and left temple    History of present illness:     Mylene Raymundo is a 80 y.o. male referred by Fredy Gale DNP. He has a biopsy-proven basal cell carcinoma on the left sideburn. This is a new basal cell carcinoma present for several months described as a lesion he noticed with no prior treatment. He also has a biopsy-proven basal cell carcinoma on the left temple. This is a new basal cell carcinoma present for more than three months described as a lesion noticed by me and Fredy Gale DNP with no prior treatment. Biopsies confirmed the diagnoses of the two basal cell carcinomas, and I reviewed the written pathology report. He is feeling well and in his usual state of health today. He has no pain, no current illnesses, no other skin concerns. His allergies, medications, medical, and social history are reviewed by me today. Exam:     He is an awake, alert, and oriented 80 y.o. male who appears well and in no distress. There is no preauricular, submandibular, or cervical lymphadenopathy. I examined his face. He has an 18 x 9 mm indistinct pearly plaque on his left sideburn. He has a 7 x 5 mm thin pink scar on his left temple. He confirms both locations. He has a small bump on his left cheek and a white pearly plaque on his left forehead. Assessment/plan:    1. Basal cell carcinoma, left sideburn. I discussed the diagnosis of basal cell carcinoma and summarized the pathology report. Mohs surgery is indicated by site, size, and poor definition. The procedure was discussed, verbal and written consent were obtained. I performed the procedure. Three stages were required to reach a tumor free plane. The surgical defect was managed with complex repair. There were no complications. He will follow up as needed as the site heals.     Indications, risks, and options were discussed with Debra Castellanos preoperatively. Risks including, but not limited to: pain, bleeding, infection, tumor recurrence, scarring and damage to motor and/or sensory nerves, were discussed. Debra Castellanos chose Mohs surgery. Debra Castellanos was an acceptable surgery candidate. Debra Castellanos was placed in the appropriate position on the operating table in the Mohs surgery procedure room. The area was prepped and draped in the standard manner. Gentian violet was used to outline the clinical margins of the tumor. Local anesthesia was then obtained. The grossly visible tumor was then removed, an underlying layer was excised and mapped according to the Mohs technique, and the individual specimens examined microscopically. The process was repeated until microscopic examination of the tissue specimens confirmed a tumor-free plane. Hemostasis was obtained with electrosurgery and pressure. The wound was covered between stages with moist saline gauze. The wound management options of second intent healing, layered closure, local flap, and/or full thickness skin graft were discussed. Debra Castellanos understands the aims, risks, alternatives, and possible complications and elects to proceed with a complex layered closure. Wound margins were made vertical, edges undermined in the subcutaneous plane, standing cones corrected at both poles followed by layered closure. The wound was closed with buried 5-0 polysorb suture in the muscle and deep subcutis to reduce width of the wound, a second layer in the dermis to reduce tension on the skin edges, and skin edges were approximated with 6-0 fast gut suture. The final closure length was 41 mm. The wound was bandaged with Vaseline, Telfa, gauze and Coverroll. Wound care instructions (written and verbal) and a follow up appointment were given to Debra Castellanos before discharge. Debra Castellanos was discharged in good condition.     2. Basal cell carcinoma, left temple. I discussed the diagnosis of basal cell carcinoma and summarized the pathology report. Mohs surgery is indicated by site and size. The procedure was discussed, verbal and written consent were obtained. I performed the procedure. Three stages were required to reach a tumor free plane. The surgical defect was managed with intermediate repair. There were no complications. He will follow up as needed as the site heals. Indications, risks, and options were discussed with Debra Castellanos preoperatively. Risks including, but not limited to: pain, bleeding, infection, tumor recurrence, scarring and damage to motor and/or sensory nerves, were discussed. Debra Castellanos chose Mohs surgery. Debra Castellanos was an acceptable surgery candidate. Dbera Castellanos was placed in the appropriate position on the operating table in the Mohs surgery procedure room. The area was prepped and draped in the standard manner. Gentian violet was used to outline the clinical margins of the tumor. Local anesthesia was then obtained. The grossly visible tumor was then removed, an underlying layer was excised and mapped according to the Mohs technique, and the individual specimens examined microscopically. The process was repeated until microscopic examination of the tissue specimens confirmed a tumor-free plane. Hemostasis was obtained with electrosurgery and pressure. The wound was covered between stages with moist saline gauze. The wound management options of second intent healing, layered closure, local flap, and/or full thickness skin graft were discussed. Debra Castellanos understands the aims, risks, alternatives, and possible complications and elects to proceed with an intermediate layered closure. Wound margins were made vertical, edges undermined in the subcutaneous plane, standing cones corrected at both poles followed by layered closure.  The wound was closed with buried 5-0 polysorb suture in the subcutis to reduce tension on the skin edges, and skin edges were approximated with 6-0 fast gut suture in the dermis to reduce tension on the epidermis. The final closure length was 23 mm. The wound was bandaged with Vaseline, Telfa, gauze and Coverroll. Wound care instructions (written and verbal) and a follow up appointment were given to Debra Screen before discharge. Debra Screen was discharged in good condition. 3. History of nonmelanoma skin cancer. I discussed the diagnosis and recommend routine examinations with Blanca Russell DNP for surveillance. 4.  Basal cell carcinomas clinically diagnosed on the left cheek and forehead. These were discussed and will be addressed at a future visit. The documentation recorded by the scribe accurately reflects the service I personally performed and the decisions made by me. [facility]   OFFICE PROCEDURE PROGRESS NOTE     Chart reviewed for the following:     Nakul Edwards MD, have reviewed the History, Physical and updated the Allergic reactions for [address] performed immediately prior to start of procedure:     Nakul Edwards MD, have performed the following reviews on Debra Screen prior to the start of the procedure:     * Patient was identified by name and date of birth   * Agreement on procedure being performed was verified   * Risks and Benefits explained to the patient   * Procedure site verified and marked as necessary   * Patient was positioned for comfort   * Consent was signed and verified     Time: 10:45 AM   Date of procedure: 4/25/2018  Procedure performed by: Sarah Saenz.  Karli Edwards MD   Provider assisted by: LPN   Patient assisted by: self   How tolerated by patient: tolerated the procedure well with no complications   Comments: none

## 2018-04-25 NOTE — PATIENT INSTRUCTIONS
WOUND CARE INSTRUCTIONS    1. Keep the dressing clean and dry and do not remove for 48 hours. 2. Then change the dressing once a day as follows:  a. Wash hands before and after each dressing change. b. Remove dressing and wash site gently with mild soap and water, rinse, and pat dry.  c. Apply an ointment (Bacitracin, Polysporin, Neosporin, Petroleum jelly or Aquaphor). d. Apply a non-stick (Telfa) dressing or Band-Aid to cover the wound. Remove pressure bandage on Friday, then wash gently and apply a thin layer Vaseline and a band-aid to site daily for 1 week. 3. Watch for:  BLEEDING: A small amount of drainage may occur. If bleeding occurs, elevate and rest the surgery site. Apply gauze and steady pressure for 15 minutes. If bleeding continues, call this office. INFECTION: Signs of infection include increased redness, pain, warmth, drainage of pus, and fever. If this occurs, call this office. 4. Special Instructions (follow any that are checked):  · [x] You have stitches that  DO NOT need to be removed. · [x] Avoid bending at the waist and heavy lifting for two days. · [x] Sleep with your head elevated for the next two nights. · [x] Rest the surgery site and keep it elevated as much as possible for two days. · [] You may apply an ice-pack for 10-15 minutes every waking hour for the rest of the day. · [] Eat a soft diet and avoid hot food and hot drinks for the rest of the day. · [] Other instructions: Follow up as directed. Take Tylenol or Ibuprofen for pain as needed. Once the site is healed with no remaining bandages or open areas, protect your surgical site and scar from the sun, as this area will be more sensitive. Use a broad spectrum sunscreen SPF 30 or higher daily, and a chemical free product (one containing zinc oxide or titanium dioxide) is a good choice if the area is sensitive.     You may begin to gently massage the surgical site in 2-3 weeks, rubbing in a circular motion along the scar. This can help reduce swelling and thickness of a scar. A scar cream may be used beginnning 1 month after the surgery. If you have any questions or concerns, please call our office Monday through Friday at [phone].

## 2018-04-25 NOTE — MR AVS SNAPSHOT
[address] [facility] [address] 
[phone] Patient: Ernesto Womack MRN: [id number] BBB:[birth year] Visit Information Date & Time Provider Department Dept. Phone Encounter #  
 4/25/2018 10:30 AM MD Cipriano Yoder [id number] Upcoming Health Maintenance Date Due DTaP/Tdap/Td series (1 - Tdap) 6/8/1956 ZOSTER VACCINE AGE 60> 4/8/1995 GLAUCOMA SCREENING Q2Y 6/8/2000 Pneumococcal 65+ High/Highest Risk (1 of 2 - PCV13) 6/8/2000 Influenza Age 5 to Adult 8/1/2017 MEDICARE YEARLY EXAM 3/14/2018 Allergies as of 4/25/2018  Review Complete On: 4/25/2018 By: Tala Good Severity Noted Reaction Type Reactions Lisinopril-hydrochlorothiazide  12/20/2012    Other (comments) Lip and facial swelling Current Immunizations  Never Reviewed No immunizations on file. Not reviewed this visit You Were Diagnosed With   
  
 Codes Comments Basal cell carcinoma of left temple region    -  Primary ICD-10-CM: C44.319 ICD-9-CM: 173.31 Basal cell carcinoma of sideburn area     ICD-10-CM: C44.319 ICD-9-CM: 173.31 Vitals BP Pulse Temp Resp Height(growth percentile) Weight(growth percentile) 150/88 (BP 1 Location: Left arm, BP Patient Position: Sitting) 63 97.9 °F (36.6 °C) (Oral) 16 5' 8.5\" (1.74 m) 168 lb (76.2 kg) SpO2 BMI Smoking Status 99% 25.17 kg/m2 Former Smoker BMI and BSA Data Body Mass Index Body Surface Area  
 25.17 kg/m 2 1.92 m 2 Preferred Pharmacy Pharmacy Name Phone [address] [phone] Your Updated Medication List  
  
   
This list is accurate as of 4/25/18 11:05 AM.  Always use your most recent med list.  
  
  
  
  
 * aspirin delayed-release 81 mg tablet Take  by mouth daily. * aspirin 325 mg tablet Commonly known as:  ASPIRIN Take 1 tablet by mouth daily,  
  
 atorvastatin 40 mg tablet Commonly known as:  LIPITOR  
  
 cholecalciferol 1,000 unit Cap Commonly known as:  VITAMIN D3 Take 2,000 Units by mouth daily ,  
  
 fluorouracil 5 % chemo cream  
Commonly known as:  EFUDEX Apply  to affected area two (2) times a day. furosemide 40 mg tablet Commonly known as:  LASIX Take once daily for 5 days.,  
  
 GLUCOTROL 5 mg tablet Generic drug:  glipiZIDE Take  by mouth two (2) times a day.  
  
 imiquimod 5 % cream  
Commonly known as:  Kindra Fernandez Apply 1 Packet to affected area five (5) days a week. apply a thin layer as directed to mid right ear nightly Monday-Friday for 8 weeks total.  Indications: SUPERFICIAL BASAL CELL CARCINOMA LORazepam 0.5 mg tablet Commonly known as:  ATIVAN Take 0.5 mg by mouth nightly as needed for Anxiety,  
  
 losartan 25 mg tablet Commonly known as:  COZAAR Take  by mouth daily. magnesium oxide 400 mg tablet Commonly known as:  MAG-OX Take 400 mg by mouth daily. metoprolol succinate 25 mg XL tablet Commonly known as:  TOPROL-XL  
  
 metoprolol tartrate 50 mg tablet Commonly known as:  LOPRESSOR Take 0.5 tablets by mouth 2 times daily,  
  
 mirtazapine 15 mg tablet Commonly known as:  Phyllistine Bright Take  by mouth nightly. multivitamin tablet Commonly known as:  ONE A DAY Take 1 Tab by mouth daily. oxybutynin chloride XL 15 mg CR tablet Commonly known as:  DITROPAN XL Take 15 mg by mouth daily. * sertraline 100 mg tablet Commonly known as:  ZOLOFT Take 125 mg by mouth daily. * sertraline 50 mg tablet Commonly known as:  ZOLOFT  
  
 simvastatin 20 mg tablet Commonly known as:  ZOCOR Take 20 mg by mouth nightly. tamsulosin 0.4 mg capsule Commonly known as:  FLOMAX  
1 daily for urine flow * terazosin 10 mg capsule Commonly known as:  HYTRIN  
 Take 10 mg by mouth nightly. * terazosin 5 mg capsule Commonly known as:  HYTRIN Take 5 mg by mouth daily. * Notice: This list has 6 medication(s) that are the same as other medications prescribed for you. Read the directions carefully, and ask your doctor or other care provider to review them with you. Patient Instructions WOUND CARE INSTRUCTIONS 1. Keep the dressing clean and dry and do not remove for 48 hours. 2. Then change the dressing once a day as follows: 
a. Wash hands before and after each dressing change. b. Remove dressing and wash site gently with mild soap and water, rinse, and pat dry. 
c. Apply an ointment (Bacitracin, Polysporin, Neosporin, Petroleum jelly or Aquaphor). d. Apply a non-stick (Telfa) dressing or Band-Aid to cover the wound. Remove pressure bandage on Friday, then wash gently and apply a thin layer Vaseline and a band-aid to site daily for 1 week. 3. Watch for: BLEEDING: A small amount of drainage may occur. If bleeding occurs, elevate and rest the surgery site. Apply gauze and steady pressure for 15 minutes. If bleeding continues, call this office. INFECTION: Signs of infection include increased redness, pain, warmth, drainage of pus, and fever. If this occurs, call this office. 4. Special Instructions (follow any that are checked): 
· [x] You have stitches that  DO NOT need to be removed. · [x] Avoid bending at the waist and heavy lifting for two days. · [x] Sleep with your head elevated for the next two nights. · [x] Rest the surgery site and keep it elevated as much as possible for two days. · [] You may apply an ice-pack for 10-15 minutes every waking hour for the rest of the day. · [] Eat a soft diet and avoid hot food and hot drinks for the rest of the day. · [] Other instructions: Follow up as directed. Take Tylenol or Ibuprofen for pain as needed. Once the site is healed with no remaining bandages or open areas, protect your surgical site and scar from the sun, as this area will be more sensitive. Use a broad spectrum sunscreen SPF 30 or higher daily, and a chemical free product (one containing zinc oxide or titanium dioxide) is a good choice if the area is sensitive. You may begin to gently massage the surgical site in 2-3 weeks, rubbing in a circular motion along the scar. This can help reduce swelling and thickness of a scar. A scar cream may be used beginnning 1 month after the surgery. If you have any questions or concerns, please call our office Monday through Friday at [phone]. Introducing \[facility] [place] [facility]\"" & HEALTH SERVICES! [employer] introduces [employer] patient portal. Now you can access parts of your medical record, email your doctor's office, and request medication refills online. 1. In your internet browser, go to [email or web address] 2. Click on the First Time User? Click Here link in the Sign In box. You will see the New Member Sign Up page. 3. Enter your [employer] Access Code exactly as it appears below. You will not need to use this code after youve completed the sign-up process. If you do not sign up before the expiration date, you must request a new code. · [employer] Access Code: [email or web address] Expires: 7/23/2018 11:43 AM 
 
4. Enter the last four digits of your Social Security Number (xxxx) and Date of Birth (mm/dd/yyyy) as indicated and click Submit. You will be taken to the next sign-up page. 5. Create a [employer] ID. This will be your [employer] login ID and cannot be changed, so think of one that is secure and easy to remember. 6. Create a [employer] password. You can change your password at any time. 7. Enter your Password Reset Question and Answer. This can be used at a later time if you forget your password. 8. Enter your e-mail address.  You will receive e-mail notification when new information is available in [email or web address]. 9. Click Sign Up. You can now view and download portions of your medical record. 10. Click the Download Summary menu link to download a portable copy of your medical information. If you have questions, please visit the Frequently Asked Questions section of the [email or web address] website. Remember, [email or web address] is NOT to be used for urgent needs. For medical emergencies, dial 911. Now available from your iPhone and Android! Please provide this summary of care documentation to your next provider. Your primary care clinician is listed as Herb Cormier. If you have any questions after today's visit, please call [phone].

## 2018-06-27 ENCOUNTER — OFFICE VISIT (OUTPATIENT)
Dept: DERMATOLOGY | Facility: AMBULATORY SURGERY CENTER | Age: 83
End: 2018-06-27

## 2018-06-27 VITALS
SYSTOLIC BLOOD PRESSURE: 144 MMHG | RESPIRATION RATE: 16 BRPM | TEMPERATURE: 97.6 F | DIASTOLIC BLOOD PRESSURE: 84 MMHG | OXYGEN SATURATION: 97 % | HEART RATE: 67 BPM

## 2018-06-27 DIAGNOSIS — D48.5 NEOPLASM OF UNCERTAIN BEHAVIOR OF CHEEK: ICD-10-CM

## 2018-06-27 DIAGNOSIS — C44.319 BASAL CELL CARCINOMA OF LEFT FOREHEAD: ICD-10-CM

## 2018-06-27 DIAGNOSIS — C44.319 BASAL CELL CARCINOMA OF LEFT CHEEK: ICD-10-CM

## 2018-06-27 DIAGNOSIS — D48.5 NEOPLASM OF UNCERTAIN BEHAVIOR OF SKIN OF FOREHEAD: Primary | ICD-10-CM

## 2018-06-27 NOTE — MR AVS SNAPSHOT
[address] [facility] [address] 
[phone] Patient: Arnulfo Viera MRN: [id number] DEMI:[birth year] Visit Information Date & Time Provider Department Dept. Phone Encounter #  
 6/27/2018 10:30 AM MD Cipriano Akins [id number] [phone] [id number] Upcoming Health Maintenance Date Due DTaP/Tdap/Td series (1 - Tdap) 6/8/1956 ZOSTER VACCINE AGE 60> 4/8/1995 GLAUCOMA SCREENING Q2Y 6/8/2000 Pneumococcal 65+ Low/Medium Risk (1 of 2 - PCV13) 6/8/2000 MEDICARE YEARLY EXAM 3/14/2018 Influenza Age 5 to Adult 8/1/2018 Allergies as of 6/27/2018  Review Complete On: 6/27/2018 By: Alma Trujillo LPN Severity Noted Reaction Type Reactions Lisinopril-hydrochlorothiazide  12/20/2012    Other (comments) Lip and facial swelling Current Immunizations  Never Reviewed No immunizations on file. Not reviewed this visit Vitals BP Pulse Temp Resp SpO2 Smoking Status 144/84 67 97.6 °F (36.4 °C) 16 97% Former Smoker Preferred Pharmacy Pharmacy Name Phone [address] - [address] [facility] [address] [phone] Your Updated Medication List  
  
   
This list is accurate as of 6/27/18 11:40 AM.  Always use your most recent med list.  
  
  
  
  
 aspirin delayed-release 81 mg tablet Take  by mouth daily. atorvastatin 40 mg tablet Commonly known as:  LIPITOR  
  
 cholecalciferol 1,000 unit Cap Commonly known as:  VITAMIN D3 Take 2,000 Units by mouth daily ,  
  
 magnesium oxide 400 mg tablet Commonly known as:  MAG-OX Take 400 mg by mouth daily. metoprolol succinate 25 mg XL tablet Commonly known as:  TOPROL-XL  
  
 mirtazapine 15 mg tablet Commonly known as:  Ival Rapp Take 7.5 mg by mouth nightly. multivitamin tablet Commonly known as:  ONE A DAY Take 1 Tab by mouth daily. oxybutynin chloride XL 15 mg CR tablet Commonly known as:  DITROPAN XL Take 15 mg by mouth daily. sertraline 50 mg tablet Commonly known as:  ZOLOFT  
  
 terazosin 5 mg capsule Commonly known as:  HYTRIN Take 5 mg by mouth daily. Patient Instructions WOUND CARE INSTRUCTIONS 1. Keep the dressing clean and dry and do not remove for 48 hours. 2. Then change the dressing once a day as follows: 
a. Wash hands before and after each dressing change. b. Remove dressing and wash site gently with mild soap and water, rinse, and pat dry. 
c. Apply an ointment (Bacitracin, Polysporin, Neosporin, Petroleum jelly or Aquaphor). d. Apply a non-stick (Telfa) dressing or Band-Aid to cover the wound. Remove pressure bandage on Friday, then wash gently and apply a thin layer of Vaseline and a band-aid to site daily for 1 week. 3. Watch for: BLEEDING: A small amount of drainage may occur. If bleeding occurs, elevate and rest the surgery site. Apply gauze and steady pressure for 15 minutes. If bleeding continues, call this office. INFECTION: Signs of infection include increased redness, pain, warmth, drainage of pus, and fever. If this occurs, call this office. 4. Special Instructions (follow any that are checked): 
· [x] You have stitches that DO NOT need to be removed. · [x] Avoid bending at the waist and heavy lifting for two days. · [x] Sleep with your head elevated for the next two nights. · [x] Rest the surgery site and keep it elevated as much as possible for two days. · [x] You may apply an ice-pack for 10-15 minutes every waking hour for the rest of the day. · [] Eat a soft diet and avoid hot food and hot drinks for the rest of the day. · [] Other instructions: Follow up as directed. Take Tylenol or Ibuprofen for pain as needed.  
 
 
Once the site is healed with no remaining bandages or open areas, protect your surgical site and scar from the sun, as this area will be more sensitive. Use a broad spectrum sunscreen SPF 30 or higher daily, and a chemical free product (one containing zinc oxide or titanium dioxide) is a good choice if the area is sensitive. You may begin to gently massage the surgical site in 2-3 weeks, rubbing in a circular motion along the scar. This can help reduce swelling and thickness of a scar. A scar cream may be used beginnning 1 month after the surgery. If you have any questions or concerns, please call our office Monday through Friday at [phone]. Introducing [facility]! [employer] introduces Behavio patient portal. Now you can access parts of your medical record, email your doctor's office, and request medication refills online. 1. In your internet browser, go to [email or web address] 2. Click on the First Time User? Click Here link in the Sign In box. You will see the New Member Sign Up page. 3. Enter your Behavio Access Code exactly as it appears below. You will not need to use this code after youve completed the sign-up process. If you do not sign up before the expiration date, you must request a new code. · Behavio Access Code: [email or web address] Expires: 7/23/2018 11:43 AM 
 
4. Enter the last four digits of your Social Security Number (xxxx) and Date of Birth (mm/dd/yyyy) as indicated and click Submit. You will be taken to the next sign-up page. 5. Create a [employer] ID. This will be your Behavio login ID and cannot be changed, so think of one that is secure and easy to remember. 6. Create a Behavio password. You can change your password at any time. 7. Enter your Password Reset Question and Answer. This can be used at a later time if you forget your password. 8. Enter your e-mail address. You will receive e-mail notification when new information is available in [address] 9. Click Sign Up. You can now view and download portions of your medical record. 10. Click the Download Summary menu link to download a portable copy of your medical information. If you have questions, please visit the Frequently Asked Questions section of the digitalbox website. Remember, digitalbox is NOT to be used for urgent needs. For medical emergencies, dial 911. Now available from your iPhone and Android! Please provide this summary of care documentation to your next provider. Your primary care clinician is listed as Hoda Sharma. If you have any questions after today's visit, please call [phone].

## 2018-06-27 NOTE — PROGRESS NOTES
This note is written by Vlad Roper, as dictated by Nader Chavez. Emerald Hernandez MD.    CC: Suspected basal cell carcinomas on the left mid cheek and left forehead    History of present illness:     Alberto Jimenez is a 80 y.o. male referred by Dodie Sommer. He does not have current complaints related to his skin, no acutely symptomatic areas. He presents for a skin exam of his face, chest, and back. He is feeling well and in his usual state of health today. He has no pain, no current illnesses, no other skin concerns. His allergies, medications, medical, and social history are reviewed by me today. Exam:     He is an awake, alert, and oriented 80 y.o. male who appears well and in no distress. There is no preauricular, submandibular, or cervical lymphadenopathy. I examined his face, chest, and back. He has a 6 x 3 mm pink papule with telangiectasias on his left mid cheek. He has a 11 x 10 scar-like plaque on his left forehead. He confirms locations. His back has healed scars with no evidence of recurrent skin cancers. Assessment/plan:    1. Suspected basal cell carcinoma, left mid cheek. A shave biopsy was advised to sample this lesion. The procedure was reviewed and verbal and written consent were obtained. The risks of pain, bleeding, infection, and scar were discussed. The patient is aware that this is a sample and is intended for diagnosis and not therapy of the skin lesion. I performed the procedure. The site was cleansed and anesthetized with 1% Lidocaine with Epinephrine 1:100,000. A shave biopsy was performed to sample the lesion. Drysol was used for hemostasis. The wound was bandaged and care reviewed. The specimen was processed in the Mohs lab.     Dermatology Pathology Report  [id number]  [facility], [address] [facility], [address] [place] [address]      Name: Alberto Jimenez    YOB: 1935    Specimen #:     Date: 6/27/2018      Submitting physician: Cherelle Momin MD      Source of tissue: Left mid cheek    Clinical Diagnosis: Basal cell carcinoma    Gross description:  Received fresh is a 3 x 3 mm shave biopsy of skin. The specimen is bisected and processed for frozen vertical sections. Microscopic description: This hematoxylin and eosin stained specimen has a nest of basaloid cells in the dermis    Pathology diagnosis: Nodular basal cell carcinoma      Cherelle Momin MD      2. Basal cell carcinoma, left mid cheek. I discussed the diagnosis of basal cell carcinoma and summarized the pathology report. Mohs surgery is indicated by site and size. The procedure was discussed, verbal and written consent were obtained. I performed the procedure. One stage was required to reach a tumor free plane. The surgical defect was managed with an intermediate repair. There were no complications. He will follow up as needed as the site heals. Indications, risks, and options were discussed with Radha Fitzgerald preoperatively. Risks including, but not limited to: pain, bleeding, infection, tumor recurrence, scarring and damage to motor and/or sensory nerves, were discussed. Radha Fitzgerald chose Mohs surgery. Radha Fitzgerald was an acceptable surgery candidate. Radha Fitzgerald was placed in the appropriate position on the operating table in the Mohs surgery procedure room. The area was prepped and draped in the standard manner. Gentian violet was used to outline the clinical margins of the tumor. Local anesthesia was then obtained. The grossly visible tumor was then removed, an underlying layer was excised and mapped according to the Mohs technique, and the individual specimens examined microscopically. The process was repeated until microscopic examination of the tissue specimens confirmed a tumor-free plane. Hemostasis was obtained with electrosurgery and pressure. The wound was covered between stages with moist saline gauze.      The wound management options of second intent healing, layered closure, local flap, and/or full thickness skin graft were discussed. Debra Castellanos understands the aims, risks, alternatives, and possible complications and elects to proceed with an intermediate layered closure. Wound margins were made vertical, edges undermined in the subcutaneous plane, standing cones corrected at both poles followed by layered closure. The wound was closed with buried 5-0 polysorb suture in the subcutis to reduce tension on the skin edges, and skin edges were approximated with 5-0 polysorb suture in the dermis to reduce tension on the epidermis. The final closure length was 30 mm. The wound was bandaged with Vaseline, Telfa, gauze and Coverroll. Wound care instructions (written and verbal) and a follow up appointment were given to Debra Castellanos before discharge. Debra Castellanos was discharged in good condition. 3. Suspected basal cell carcinoma, left forehead. A shave biopsy was advised to sample this lesion. The procedure was reviewed and verbal and written consent were obtained. The risks of pain, bleeding, infection, and scar were discussed. The patient is aware that this is a sample and is intended for diagnosis and not therapy of the skin lesion. I performed the procedure. The site was cleansed and anesthetized with 1% Lidocaine with Epinephrine 1:100,000. A shave biopsy was performed to sample the lesion. Drysol was used for hemostasis. The wound was bandaged and care reviewed. The specimen was processed in the Mohs lab.       Dermatology Pathology Report  [facility], [address]      Name: Debra Castellanos    YOB: 1935    Specimen #:     Date: 6/27/2018      Submitting physician: Nadia Grey MD      Source of tissue: Left forehead    Clinical Diagnosis: Basal cell carcinoma    Gross description:  Received fresh is a 6 x 5 mm shave biopsy of skin. The specimen is bisected and processed for frozen vertical sections. Microscopic description: This hematoxylin and eosin stained specimen shows small nests of basaloid cells in the dermis    Pathology diagnosis: Micronodular basal cell carcinoma      Shonda Sorenson MD    4. Basal cell carcinoma, left forehead. I discussed the diagnosis of basal cell carcinoma and summarized the pathology report. Mohs surgery is indicated by site, size, and histology. The procedure was discussed, verbal and written consent were obtained. I performed the procedure. One stage was required to reach a tumor free plane. The surgical defect was managed with a complex repair. There were no complications. He will follow up as needed as the site heals. Indications, risks, and options were discussed with Eduardo Campoverde preoperatively. Risks including, but not limited to: pain, bleeding, infection, tumor recurrence, scarring and damage to motor and/or sensory nerves, were discussed. Eduardo Poolemarj chose Mohs surgery. Eduardo Campoverde was an acceptable surgery candidate. Eduardo Campoverde was placed in the appropriate position on the operating table in the Mohs surgery procedure room. The area was prepped and draped in the standard manner. Gentian violet was used to outline the clinical margins of the tumor. Local anesthesia was then obtained. The grossly visible tumor was then removed, an underlying layer was excised and mapped according to the Mohs technique, and the individual specimens examined microscopically. The process was repeated until microscopic examination of the tissue specimens confirmed a tumor-free plane. Hemostasis was obtained with electrosurgery and pressure. The wound was covered between stages with moist saline gauze. The wound management options of second intent healing, layered closure, local flap, and/or full thickness skin graft were discussed.  Kulwinder Amin Kendall Olivera understands the aims, risks, alternatives, and possible complications and elects to proceed with a complex layered closure. Wound margins were made vertical, edges undermined in the muscular plane, standing cones corrected at both poles followed by layered closure. The wound was closed with buried 5-0 polysorb suture in the muscle and deep subcutis to reduce width of the wound, a second layer in the dermis to reduce tension on the skin edges, and skin edges were approximated with 6-0 fast gut suture. The final closure length was 35 mm. The wound was bandaged with Vaseline, Telfa, gauze and Coverroll. Wound care instructions (written and verbal) and a follow up appointment were given to Estuardo Laureano before discharge. Estuardo Laureano was discharged in good condition. 5.History of nonmelanoma skin cancer. I discussed the diagnosis and recommend routine examinations with Collette Salk, DNP for surveillance. The documentation recorded by the scribe accurately reflects the service I personally performed and the decisions made by me. [facility]   OFFICE PROCEDURE PROGRESS NOTE     Chart reviewed for the following:     Krishna Shaw MD, have reviewed the History, Physical and updated the Allergic reactions for [address] performed immediately prior to start of procedure:     Krishna Shaw MD, have performed the following reviews on Estuardo Laureano prior to the start of the procedure:     * Patient was identified by name and date of birth   * Agreement on procedure being performed was verified   * Risks and Benefits explained to the patient   * Procedure site verified and marked as necessary   * Patient was positioned for comfort   * Consent was signed and verified     Time: 10:30 AM  Date of procedure: 6/27/2018  Procedure performed by: Warden Ross.  Bunny Shaw MD   Provider assisted by: RN  Patient assisted by: self   How tolerated by patient: tolerated the procedure well with no complications   Comments: none

## 2018-06-27 NOTE — PATIENT INSTRUCTIONS
WOUND CARE INSTRUCTIONS    1. Keep the dressing clean and dry and do not remove for 48 hours. 2. Then change the dressing once a day as follows:  a. Wash hands before and after each dressing change. b. Remove dressing and wash site gently with mild soap and water, rinse, and pat dry.  c. Apply an ointment (Bacitracin, Polysporin, Neosporin, Petroleum jelly or Aquaphor). d. Apply a non-stick (Telfa) dressing or Band-Aid to cover the wound. Remove pressure bandage on Friday, then wash gently and apply a thin layer of Vaseline and a band-aid to site daily for 1 week. 3. Watch for:  BLEEDING: A small amount of drainage may occur. If bleeding occurs, elevate and rest the surgery site. Apply gauze and steady pressure for 15 minutes. If bleeding continues, call this office. INFECTION: Signs of infection include increased redness, pain, warmth, drainage of pus, and fever. If this occurs, call this office. 4. Special Instructions (follow any that are checked):  · [x] You have stitches that DO NOT need to be removed. · [x] Avoid bending at the waist and heavy lifting for two days. · [x] Sleep with your head elevated for the next two nights. · [x] Rest the surgery site and keep it elevated as much as possible for two days. · [x] You may apply an ice-pack for 10-15 minutes every waking hour for the rest of the day. · [] Eat a soft diet and avoid hot food and hot drinks for the rest of the day. · [] Other instructions: Follow up as directed. Take Tylenol or Ibuprofen for pain as needed. Once the site is healed with no remaining bandages or open areas, protect your surgical site and scar from the sun, as this area will be more sensitive. Use a broad spectrum sunscreen SPF 30 or higher daily, and a chemical free product (one containing zinc oxide or titanium dioxide) is a good choice if the area is sensitive.     You may begin to gently massage the surgical site in 2-3 weeks, rubbing in a circular motion along the scar. This can help reduce swelling and thickness of a scar. A scar cream may be used beginnning 1 month after the surgery. If you have any questions or concerns, please call our office Monday through Friday at [phone].